# Patient Record
Sex: FEMALE | Race: OTHER | Employment: UNEMPLOYED | URBAN - METROPOLITAN AREA
[De-identification: names, ages, dates, MRNs, and addresses within clinical notes are randomized per-mention and may not be internally consistent; named-entity substitution may affect disease eponyms.]

---

## 2017-06-08 ENCOUNTER — APPOINTMENT (OUTPATIENT)
Dept: CT IMAGING | Age: 27
End: 2017-06-08
Attending: PHYSICIAN ASSISTANT
Payer: SELF-PAY

## 2017-06-08 ENCOUNTER — HOSPITAL ENCOUNTER (EMERGENCY)
Age: 27
Discharge: HOME | End: 2017-06-08
Attending: EMERGENCY MEDICINE
Payer: SELF-PAY

## 2017-06-08 VITALS
OXYGEN SATURATION: 100 % | RESPIRATION RATE: 14 BRPM | WEIGHT: 143 LBS | TEMPERATURE: 98.6 F | DIASTOLIC BLOOD PRESSURE: 75 MMHG | HEART RATE: 69 BPM | SYSTOLIC BLOOD PRESSURE: 133 MMHG

## 2017-06-08 DIAGNOSIS — R42 LIGHTHEADEDNESS: Primary | ICD-10-CM

## 2017-06-08 DIAGNOSIS — E86.0 DEHYDRATION: ICD-10-CM

## 2017-06-08 LAB
ANION GAP BLD CALC-SCNC: 7 MMOL/L (ref 3–18)
APPEARANCE UR: CLEAR
BASOPHILS # BLD AUTO: 0 K/UL (ref 0–0.06)
BASOPHILS # BLD: 0 % (ref 0–2)
BILIRUB UR QL: NEGATIVE
BUN SERPL-MCNC: 10 MG/DL (ref 7–18)
BUN/CREAT SERPL: 10 (ref 12–20)
CALCIUM SERPL-MCNC: 9.3 MG/DL (ref 8.5–10.1)
CHLORIDE SERPL-SCNC: 104 MMOL/L (ref 100–108)
CO2 SERPL-SCNC: 27 MMOL/L (ref 21–32)
COLOR UR: YELLOW
CREAT SERPL-MCNC: 0.96 MG/DL (ref 0.6–1.3)
DIFFERENTIAL METHOD BLD: ABNORMAL
EOSINOPHIL # BLD: 0.1 K/UL (ref 0–0.4)
EOSINOPHIL NFR BLD: 1 % (ref 0–5)
ERYTHROCYTE [DISTWIDTH] IN BLOOD BY AUTOMATED COUNT: 12.5 % (ref 11.6–14.5)
GLUCOSE BLD STRIP.AUTO-MCNC: 80 MG/DL (ref 70–110)
GLUCOSE SERPL-MCNC: 88 MG/DL (ref 74–99)
GLUCOSE UR STRIP.AUTO-MCNC: NEGATIVE MG/DL
HCG UR QL: NEGATIVE
HCT VFR BLD AUTO: 40.5 % (ref 35–45)
HGB BLD-MCNC: 13.9 G/DL (ref 12–16)
HGB UR QL STRIP: NEGATIVE
KETONES UR QL STRIP.AUTO: NEGATIVE MG/DL
LEUKOCYTE ESTERASE UR QL STRIP.AUTO: NEGATIVE
LYMPHOCYTES # BLD AUTO: 20 % (ref 21–52)
LYMPHOCYTES # BLD: 2.3 K/UL (ref 0.9–3.6)
MCH RBC QN AUTO: 30 PG (ref 24–34)
MCHC RBC AUTO-ENTMCNC: 34.3 G/DL (ref 31–37)
MCV RBC AUTO: 87.3 FL (ref 74–97)
MONOCYTES # BLD: 0.8 K/UL (ref 0.05–1.2)
MONOCYTES NFR BLD AUTO: 7 % (ref 3–10)
NEUTS SEG # BLD: 8.1 K/UL (ref 1.8–8)
NEUTS SEG NFR BLD AUTO: 72 % (ref 40–73)
NITRITE UR QL STRIP.AUTO: NEGATIVE
PH UR STRIP: 5.5 [PH] (ref 5–8)
PLATELET # BLD AUTO: 262 K/UL (ref 135–420)
PMV BLD AUTO: 10.7 FL (ref 9.2–11.8)
POTASSIUM SERPL-SCNC: 4.1 MMOL/L (ref 3.5–5.5)
PROT UR STRIP-MCNC: NEGATIVE MG/DL
RBC # BLD AUTO: 4.64 M/UL (ref 4.2–5.3)
SODIUM SERPL-SCNC: 138 MMOL/L (ref 136–145)
SP GR UR REFRACTOMETRY: 1.01 (ref 1–1.03)
UROBILINOGEN UR QL STRIP.AUTO: 0.2 EU/DL (ref 0.2–1)
WBC # BLD AUTO: 11.4 K/UL (ref 4.6–13.2)

## 2017-06-08 PROCEDURE — 81003 URINALYSIS AUTO W/O SCOPE: CPT | Performed by: PHYSICIAN ASSISTANT

## 2017-06-08 PROCEDURE — 96361 HYDRATE IV INFUSION ADD-ON: CPT

## 2017-06-08 PROCEDURE — 96374 THER/PROPH/DIAG INJ IV PUSH: CPT

## 2017-06-08 PROCEDURE — 85025 COMPLETE CBC W/AUTO DIFF WBC: CPT | Performed by: PHYSICIAN ASSISTANT

## 2017-06-08 PROCEDURE — 99285 EMERGENCY DEPT VISIT HI MDM: CPT

## 2017-06-08 PROCEDURE — 74011250636 HC RX REV CODE- 250/636: Performed by: PHYSICIAN ASSISTANT

## 2017-06-08 PROCEDURE — 82962 GLUCOSE BLOOD TEST: CPT

## 2017-06-08 PROCEDURE — 70450 CT HEAD/BRAIN W/O DYE: CPT

## 2017-06-08 PROCEDURE — 81025 URINE PREGNANCY TEST: CPT | Performed by: PHYSICIAN ASSISTANT

## 2017-06-08 PROCEDURE — 80048 BASIC METABOLIC PNL TOTAL CA: CPT | Performed by: PHYSICIAN ASSISTANT

## 2017-06-08 RX ORDER — ONDANSETRON 4 MG/1
TABLET, ORALLY DISINTEGRATING ORAL
Qty: 10 TAB | Refills: 0 | Status: SHIPPED | OUTPATIENT
Start: 2017-06-08

## 2017-06-08 RX ORDER — ONDANSETRON 2 MG/ML
4 INJECTION INTRAMUSCULAR; INTRAVENOUS
Status: COMPLETED | OUTPATIENT
Start: 2017-06-08 | End: 2017-06-08

## 2017-06-08 RX ADMIN — ONDANSETRON 4 MG: 2 INJECTION INTRAMUSCULAR; INTRAVENOUS at 10:22

## 2017-06-08 RX ADMIN — SODIUM CHLORIDE 1000 ML: 900 INJECTION, SOLUTION INTRAVENOUS at 08:18

## 2017-06-08 NOTE — ED NOTES
Complains of right frontal headache and pain behind right eye. Complains of right thigh pain. Requests aspirin for pain. Reports that these symptoms have occurred before, but resolved and pt did not seek treatment. Reports that this time is worse in severity.

## 2017-06-08 NOTE — ED NOTES
Purposeful rounding completed:    Side rails up x 1:  YES   Bed low and wheels and locked: YES   Call bell in reach: YES   Comfort addressed: YES     Toileting needs addressed: YES   Plan of care reviewed/updated with patient and or family members: YES   IV site assessed: YES  Pain assessed and addressed: [id number]

## 2017-06-08 NOTE — ED PROVIDER NOTES
HPI Comments: 29 yo F c/o right sided facial pain and near-syncope which started this morning. States she got up around 0500 this morning to go to the bathroom, had numbness and tingling on right side of body (face, arm and leg). Noticed her vision starting to go dark and felt like she might pass out. Sat down and started to feel better. Here in ED back to baseline but has mild right sided HA. Yesterday felt well. Denies fever, chills, CP, SOB, nausea, vomiting. No other complaints. Pt is English speaker, refused [employer] translation, wants to use visitor at bedside. Patient is a 28 y.o. female presenting with facial pain. Facial Pain    Pertinent negatives include no vomiting. History reviewed. No pertinent past medical history. History reviewed. No pertinent surgical history. History reviewed. No pertinent family history. Social History     Social History    Marital status:      Spouse name: N/A    Number of children: N/A    Years of education: N/A     Occupational History    Not on file. Social History Main Topics    Smoking status: Never Smoker    Smokeless tobacco: Not on file    Alcohol use No    Drug use: No    Sexual activity: Not on file     Other Topics Concern    Not on file     Social History Narrative    No narrative on file         ALLERGIES: Review of patient's allergies indicates no known allergies. Review of Systems   Constitutional: Negative for chills and fever. Respiratory: Negative for shortness of breath. Cardiovascular: Negative for chest pain. Gastrointestinal: Positive for nausea. Negative for abdominal pain and vomiting. Neurological: Positive for light-headedness. Negative for syncope. All other systems reviewed and are negative.       Vitals:    06/08/17 0610 06/08/17 0630 06/08/17 0645   BP: 119/87 115/78 113/72   Pulse: 88 81 82   Resp: 16 15 12   Temp: 98.7 °F (37.1 °C)     SpO2: 100% 100% 100%   Weight: 64.9 kg (143 lb) Physical Exam   Constitutional: She is oriented to person, place, and time. She appears well-developed and well-nourished. No distress. HENT:   Head: Normocephalic and atraumatic. Eyes: Conjunctivae and EOM are normal. Pupils are equal, round, and reactive to light. Neck: Normal range of motion. Neck supple. Cardiovascular: Normal rate, regular rhythm and normal heart sounds. Pulmonary/Chest: Effort normal and breath sounds normal. No respiratory distress. She has no wheezes. She has no rales. Abdominal: Soft. She exhibits no distension. There is no tenderness. There is no rebound and no guarding. Musculoskeletal: Normal range of motion. Neurological: She is alert and oriented to person, place, and time. She displays no tremor. No cranial nerve deficit or sensory deficit. She exhibits normal muscle tone. Coordination and gait normal.   Ambulating in ED without difficulty or assistance. Skin: Skin is warm and dry. Psychiatric: She has a normal mood and affect. Her behavior is normal. Judgment and thought content normal.   Nursing note and vitals reviewed.        MDM  Number of Diagnoses or Management Options  Dehydration:   Lightheadedness:     ED Course       Procedures    -------------------------------------------------------------------------------------------------------------------     EKG INTERPRETATIONS:      RADIOLOGY RESULTS:   CT HEAD WO CONT   Final Result          LABORATORY RESULTS:  Recent Results (from the past 12 hour(s))   CBC WITH AUTOMATED DIFF    Collection Time: 06/08/17  7:55 AM   Result Value Ref Range    WBC 11.4 4.6 - 13.2 K/uL    RBC 4.64 4.20 - 5.30 M/uL    HGB 13.9 12.0 - 16.0 g/dL    HCT 40.5 35.0 - 45.0 %    MCV 87.3 74.0 - 97.0 FL    MCH 30.0 24.0 - 34.0 PG    MCHC 34.3 31.0 - 37.0 g/dL    RDW 12.5 11.6 - 14.5 %    PLATELET 532 315 - 319 K/uL    MPV 10.7 9.2 - 11.8 FL    NEUTROPHILS 72 40 - 73 %    LYMPHOCYTES 20 (L) 21 - 52 %    MONOCYTES 7 3 - 10 % EOSINOPHILS 1 0 - 5 %    BASOPHILS 0 0 - 2 %    ABS. NEUTROPHILS 8.1 (H) 1.8 - 8.0 K/UL    ABS. LYMPHOCYTES 2.3 0.9 - 3.6 K/UL    ABS. MONOCYTES 0.8 0.05 - 1.2 K/UL    ABS. EOSINOPHILS 0.1 0.0 - 0.4 K/UL    ABS. BASOPHILS 0.0 0.0 - 0.06 K/UL    DF AUTOMATED     METABOLIC PANEL, BASIC    Collection Time: 06/08/17  7:55 AM   Result Value Ref Range    Sodium 138 136 - 145 mmol/L    Potassium 4.1 3.5 - 5.5 mmol/L    Chloride 104 100 - 108 mmol/L    CO2 27 21 - 32 mmol/L    Anion gap 7 3.0 - 18 mmol/L    Glucose 88 74 - 99 mg/dL    BUN 10 7.0 - 18 MG/DL    Creatinine 0.96 0.6 - 1.3 MG/DL    BUN/Creatinine ratio 10 (L) 12 - 20      GFR est AA >60 >60 ml/min/1.73m2    GFR est non-AA >60 >60 ml/min/1.73m2    Calcium 9.3 8.5 - 10.1 MG/DL   URINALYSIS W/ RFLX MICROSCOPIC    Collection Time: 06/08/17  8:00 AM   Result Value Ref Range    Color YELLOW      Appearance CLEAR      Specific gravity 1.009 1.005 - 1.030      pH (UA) 5.5 5.0 - 8.0      Protein NEGATIVE  NEG mg/dL    Glucose NEGATIVE  NEG mg/dL    Ketone NEGATIVE  NEG mg/dL    Bilirubin NEGATIVE  NEG      Blood NEGATIVE  NEG      Urobilinogen 0.2 0.2 - 1.0 EU/dL    Nitrites NEGATIVE  NEG      Leukocyte Esterase NEGATIVE  NEG     HCG URINE, QL    Collection Time: 06/08/17  8:00 AM   Result Value Ref Range    HCG urine, Ql. NEGATIVE  NEG     GLUCOSE, POC    Collection Time: 06/08/17  9:56 AM   Result Value Ref Range    Glucose (POC) 80 70 - 110 mg/dL           CONSULTATIONS:        PROGRESS NOTES:    10:15 AM Pt well appearing and in NAD. No focal deficits. Labs unremarkable. CT head negative. Orthostatics positive. Symptoms started after standing up from laying down. Ambulating in ED without difficulty. Feeling better with IVF and zofran. Pain has completely resolved. Will d/h to f/u with PCP for further eval.     Lengthy D/W pt regarding possible worsening of pt's condition, need for follow up and strict ED return instructions for any worsening symptoms. DISPOSITION:  ED DIAGNOSIS & DISPOSITION INFORMATION  Diagnosis:   1. Lightheadedness    2. Dehydration          Disposition: home    Follow-up Information     Follow up With Details Comments [address] [facility] [address], [place]  For further evaluation [address] [address]  [phone] [address] EMERGENCY DEPT  Immediately if symptoms worsen [address] [place] [address]  [phone]          Patient's Medications   Start Taking    ONDANSETRON (ZOFRAN ODT) 4 MG DISINTEGRATING TABLET    Take 1-2 tablets every 6-8 hours as needed for nausea and vomiting.    Continue Taking    No medications on file   These Medications have changed    No medications on file   Stop Taking    No medications on file

## 2017-06-08 NOTE — ED NOTES
Purposeful rounding completed:    Side rails up x 1:  YES   Bed low and wheels and locked: YES   Call bell in reach: YES   Comfort addressed: YES     Toileting needs addressed: YES   Plan of care reviewed/updated with patient and or family members: YES   IV site assessed: YES  Pain assessed and addressed: Dulce Betts

## 2017-06-08 NOTE — ED TRIAGE NOTES
Bruneian speaking pt reports numbness to right face and right arm at 0500. Symptoms resolved at this time. Pt with only complaint of facial pain.

## 2017-06-08 NOTE — Clinical Note
Please follow up with your primary doctor or the one below for further evaluation. Return to the ED for any concerns.

## 2017-06-08 NOTE — ED NOTES
Purposeful rounding completed:    Side rails up x 1:  YES   Bed low and wheels and locked: YES   Call bell in reach: YES   Comfort addressed: YES     Toileting needs addressed: YES   Plan of care reviewed/updated with patient and or family members: YES   IV site assessed: YES  Pain assessed and addressed: Daquan Oseguera

## 2017-06-08 NOTE — DISCHARGE INSTRUCTIONS
Aturdimiento o desmayo: Instrucciones de cuidado - [ Dle Bears or Faintness: Care Instructions ]  Instrucciones de cuidado  El aturdimiento es la sensación de que está a punto de desmayarse o de \"perder el conocimiento\". No se siente jannette si usted o lo que le rodea se [place]. Es distinto del vértigo, que es la sensación de que usted o las cosas que le rodean dan vueltas o se inclinan. El aturdimiento suele desaparecer o mejorar cuando se acuesta. Si el aturdimiento empeora, esto puede conducir a un desvanecimiento. Es común sentirse aturdido de [employer]. Por lo general, el aturdimiento no se debe a un problema grave. A menudo es causado por mc disminución de corta duración de la presión arterial y el flujo de nancy hacia la calos que se produce al ponerse de pie con demasiada rapidez cuando está acostado o sentado. La atención de seguimiento es mc parte clave de mallory tratamiento y seguridad. Asegúrese de hacer y acudir a todas las citas, y llame a mallory médico si está teniendo problemas. También es mc buena idea saber los resultados de ivone exámenes y mantener mc lista de los medicamentos que yo. ¿Cómo puede cuidarse en el hogar? · Acuéstese lizz 1 o 2 minutos cuando se sienta aturdido. Después de acostarse, siéntese lentamente y permanezca sentado de 1 a 2 minutos antes de ponerse de pie lentamente. · Evite los movimientos, las posturas o las actividades que le hayan producido aturdimiento en el pasado. · Descanse mucho, en especial si está resfriado o tiene gripe, ya que estas pueden causar aturdimiento. · Asegúrese de beber abundante líquido, en especial si tiene fiebre o si ha estado sudando. · No conduzca ni se ponga a sí mismo o ponga a otros en peligro mientras se sienta aturdido. ¿Cuándo debe pedir ayuda? Llame al 911 en cualquier momento que considere que necesita atención de [place].  Por ejemplo, llame si:  · Tiene síntomas de un ataque cerebral. Estos pueden incluir:  ¨ Entumecimiento, hormigueo, debilidad o parálisis repentinos en la janet, el brazo o la pierna, sobre todo si ocurre en un solo lado del cuerpo. ¨ Cambios súbitos en la vista. ¨ Problemas repentinos para hablar. ¨ Confusión súbita o dificultad repentina para comprender frases sencillas. ¨ Problemas repentinos para caminar o mantener el equilibrio. ¨ Un dolor de calos intenso y repentino, distinto a los alessandra de calos anteriores. · Tiene síntomas de un ataque al corazón. Estos podrían incluir:  ¨ Dolor o presión en el pecho, o mc sensación extraña en el pecho. ¨ Sudoración. ¨ Falta de aire. ¨ Náuseas o vómito. ¨ Dolor, presión o mc sensación extraña en la espalda, el mely, la mandíbula, la parte superior del abdomen, o en channing o ambos hombros o brazos. ¨ Aturdimiento o debilidad repentina. ¨ Latidos cardíacos rápidos o irregulares. Cuando llame al 911, es posible que le digan que mastique 1 aspirina para adultos o 2 a 4 aspirinas de dosis baja. Espere a la ambulancia. No trate de conducir usted mismo un automóvil. Preste especial atención a los cambios en mallory justina y asegúrese de comunicarse con mallory médico si:  · El aturdimiento Benjy Albin o no mejora con los cuidados en el hogar. ¿Dónde puede encontrar más información en inglés? Milagro Carpenter a http://sukh-hodan.info/. Lisandra Brody [id number] en la búsqueda para aprender más acerca de \"Aturdimiento o desmayo: Instrucciones de cuidado - [ Margarita Stable or Faintness: Care Instructions ]. \"  Revisado: 27 Ragan, 2016  Versión del contenido: 11.2  © 7496-9740 [employer]wise, Incorporated. Las instrucciones de cuidado fueron adaptadas bajo licencia por [employer] (which disclaims liability or warranty for this information). Si usted tiene [place] afección médica o sobre estas instrucciones, siempre pregunte a mallory profesional de justina.  [employer] niega toda garantía o responsabilidad por mallory uso de esta información. Deshidratación: Instrucciones de cuidado - [ Dehydration: Care Instructions ]  Instrucciones de cuidado  La deshidratación ocurre cuando el cuerpo pierde demasiado líquido. Puede ocurrir cuando usted no dong suficiente agua o pierde grandes cantidades de líquidos del organismo debido a la diarrea, el vómito o la sudoración. La deshidratación grave puede ser mortal.  El agua y los minerales llamados electrolitos ayudan a recuperar el equilibrio de los líquidos en el organismo. Aprenda las señales tempranas de pérdida de líquido y estephania más líquidos para prevenir la deshidratación. La atención de seguimiento es mc parte clave de mallory tratamiento y seguridad. Asegúrese de hacer y acudir a todas las citas, y llame a mallory médico si está teniendo problemas. También es mc buena idea saber los resultados de los exámenes y mantener mc lista de los medicamentos que yo. ¿Cómo puede cuidarse en el hogar? · Para prevenir la deshidratación, estephania abundantes líquidos, los suficientes jannette para que mallory orina sea de color amarillo maranda o transparente jannette el agua. Elija beber agua y otros líquidos john sin cafeína hasta que se sienta mejor. Si tiene [facility] [employer], del corazón o del hígado y tiene que Patricia's líquidos, hable con mallory médico antes de aumentar mallory consumo. · Si no siente ganas de comer o beber, trate de kvng sorbos pequeños de agua, bebidas deportivas u otras bebidas rehidratantes. · Descanse lo suficiente. Cómo prevenir la deshidratación  · Germania Moots líquidos a mallory [place] y mallory rutina diaria, a menos que mallory médico le haya dicho lo contrario. · Estephania más líquidos cuando esmer calor. Estephania aún más líquidos si hace mucho ejercicio. No tome bebidas con alcohol o cafeína. · Esté alerta a los síntomas de deshidratación. Estos incluyen:  ¨ Boca seca y pegajosa. ¨ Catalina Alexi, y en poca cantidad. ¨ Ojos secos y hundidos. ¨ Sentirse muy cansado.   · Aprenda qué problemas pueden llevar a la deshidratación. Estos incluyen:  ¨ Diarrea, fiebre y vómito. ¨ Cualquier enfermedad con fiebre, jannette la neumonía o la gripe. ¨ Actividades que causan mucha sudoración, jannette nury de resistencia y Rhea Heraclio lui en exteriores en un clima cálido o húmedo. ¨ Abuso o síndrome de abstinencia del alcohol o las drogas. ¨ Ciertos medicamentos, jannette pastillas para el resfriado y la alergia (antihistamínicos), pastillas para adelgazar (diuréticos) y laxantes. ¨ Ciertas enfermedades, jannette la diabetes, el cáncer, y la enfermedad del corazón o [place]. ¿Cuándo debe pedir ayuda? Llame al 911 en cualquier momento que considere que necesita atención de [place]. Por ejemplo, llame si:  · Se desmayó (perdió el conocimiento). Llame a mallory médico ahora mismo o busque atención médica inmediata si:  · Se siente confuso y no puede pensar con claridad. · Siente mareos o aturdimiento, o que está a punto de [place]. · Tiene señales de necesitar más líquidos. Tiene los ojos hundidos, la boca seca y [place] solo poca cantidad de color oscuro. · No puede retener líquidos en el estómago. Preste especial atención a los cambios en mallory justina y asegúrese de comunicarse con mallory médico si:  · No produce lágrimas. · Tiene la piel muy seca y esta regresa despacio a mallory lugar después de pellizcarla. · Tiene la boca y los ojos muy secos. ¿Dónde puede encontrar más información en inglés? Angella Kenney a http://ori.info/. Taylor Severe M386 en la búsqueda para aprender Edyta Long de \"Deshidratación: Instrucciones de cuidado - [ Dehydration: Care Instructions ]. \"  Revisado: 27 wong, 2016  Versión del contenido: 11.2  © 5775-6179 [employer]. Las instrucciones de cuidado fueron adaptadas bajo licencia por [employer] (which disclaims liability or warranty for this information).  Si usted tiene [place] afección médica o sobre estas instrucciones, siempre pregunte a mallory profesional de justina. [facility] [employer]orporated niega toda garantía o responsabilidad por mallory uso de esta información.